# Patient Record
Sex: FEMALE | Race: BLACK OR AFRICAN AMERICAN | NOT HISPANIC OR LATINO | Employment: UNEMPLOYED | URBAN - NONMETROPOLITAN AREA
[De-identification: names, ages, dates, MRNs, and addresses within clinical notes are randomized per-mention and may not be internally consistent; named-entity substitution may affect disease eponyms.]

---

## 2017-07-26 ENCOUNTER — HOSPITAL ENCOUNTER (EMERGENCY)
Facility: HOSPITAL | Age: 32
Discharge: HOME | End: 2017-07-26
Attending: EMERGENCY MEDICINE
Payer: COMMERCIAL

## 2017-07-26 VITALS
DIASTOLIC BLOOD PRESSURE: 74 MMHG | RESPIRATION RATE: 17 BRPM | HEART RATE: 78 BPM | HEIGHT: 67 IN | OXYGEN SATURATION: 98 % | TEMPERATURE: 97.6 F | SYSTOLIC BLOOD PRESSURE: 113 MMHG | WEIGHT: 208 LBS | BODY MASS INDEX: 32.65 KG/M2

## 2017-07-26 DIAGNOSIS — B37.3 VAGINAL YEAST INFECTION: Primary | ICD-10-CM

## 2017-07-26 DIAGNOSIS — G44.209 ACUTE TENSION HEADACHE: ICD-10-CM

## 2017-07-26 PROCEDURE — 99283 EMERGENCY DEPT VISIT LOW MDM: CPT

## 2017-07-26 PROCEDURE — 96372 THER/PROPH/DIAG INJ SC/IM: CPT

## 2017-07-26 RX ORDER — KETOROLAC TROMETHAMINE 30 MG/ML
30 INJECTION, SOLUTION INTRAMUSCULAR; INTRAVENOUS ONCE
Status: COMPLETED | OUTPATIENT
Start: 2017-07-26 | End: 2017-07-26

## 2017-07-26 RX ORDER — METHOCARBAMOL 750 MG/1
750 TABLET, FILM COATED ORAL 3 TIMES DAILY
Qty: 15 TABLET | Refills: 0 | Status: SHIPPED | OUTPATIENT
Start: 2017-07-26 | End: 2018-01-29 | Stop reason: CLARIF

## 2017-07-26 RX ORDER — METRONIDAZOLE 250 MG/1
500 TABLET ORAL ONCE
Status: DISCONTINUED | OUTPATIENT
Start: 2017-07-26 | End: 2017-07-27 | Stop reason: HOSPADM

## 2017-07-26 RX ORDER — IBUPROFEN 600 MG/1
600 TABLET ORAL EVERY 6 HOURS PRN
Qty: 30 TABLET | Refills: 0 | Status: SHIPPED | OUTPATIENT
Start: 2017-07-26 | End: 2018-01-29 | Stop reason: SDUPTHER

## 2017-07-26 RX ORDER — FLUCONAZOLE 100 MG/1
200 TABLET ORAL ONCE
Status: COMPLETED | OUTPATIENT
Start: 2017-07-26 | End: 2017-07-26

## 2017-07-26 RX ADMIN — FLUCONAZOLE 200 MG: 100 TABLET ORAL at 21:52

## 2017-07-26 RX ADMIN — KETOROLAC TROMETHAMINE 30 MG: 30 INJECTION, SOLUTION INTRAMUSCULAR; INTRAVENOUS at 21:52

## 2017-08-25 ENCOUNTER — ALLSCRIPTS OFFICE VISIT (OUTPATIENT)
Dept: OTHER | Facility: OTHER | Age: 32
End: 2017-08-25

## 2017-08-25 DIAGNOSIS — Z13.6 ENCOUNTER FOR SCREENING FOR CARDIOVASCULAR DISORDERS: ICD-10-CM

## 2017-08-25 DIAGNOSIS — G43.909 MIGRAINE WITHOUT STATUS MIGRAINOSUS, NOT INTRACTABLE: ICD-10-CM

## 2017-08-25 DIAGNOSIS — B37.3 CANDIDIASIS OF VULVA AND VAGINA: ICD-10-CM

## 2017-08-25 DIAGNOSIS — Z86.32 HISTORY OF GESTATIONAL DIABETES: ICD-10-CM

## 2017-11-21 ENCOUNTER — ALLSCRIPTS OFFICE VISIT (OUTPATIENT)
Dept: OTHER | Facility: OTHER | Age: 32
End: 2017-11-21

## 2017-11-23 NOTE — PROGRESS NOTES
Assessment    1  Acute sinusitis (461 9) (J01 90)    Plan  Acute sinusitis    · DrRx Zithromax Z-Tylor 250 MG #6 pill pack; Take 2 pills on day 1, take 1 pill ondays 2-5  PMH: Yeast infection of the vagina    · Fluconazole 150 MG Oral Tablet    Discussion/Summary  The patient was counseled regarding instructions for management,-- risk factor reductions,-- prognosis,-- patient and family education,-- risks and benefits of treatment options,-- importance of compliance with treatment  Possible side effects of new medications were reviewed with the patient/guardian today  The treatment plan was reviewed with the patient/guardian  The patient/guardian understands and agrees with the treatment plan      Chief Complaint    1  Cold Symptoms  Michaela Hale is here today with cold symptoms x 4 days  She has other sick contacts at home with similar symptoms  History of Present Illness  HPI: See chief complaint  Review of Systems   Constitutional: chills, but-- no fever  ENT: earache-- and-- sore throat  Cardiovascular: no chest pain-- and-- no palpitations  Respiratory: PND, but-- no shortness of breath-- and-- no cough  Gastrointestinal: no abdominal pain,-- no constipation-- and-- no diarrhea  ROS reviewed  Past Medical History  1  History of back pain (V13 59) (Z87 39)   2  History of gestational diabetes mellitus (GDM) (V12 21) (Z86 32)   3  History of Migraines (346 90) (G43 909)   4  History of Screening for cervical cancer (V76 2) (Z12 4)   5  History of Yeast infection of the vagina (112 1) (B37 3)  Active Problems And Past Medical History Reviewed: The active problems and past medical history were reviewed and updated today  Family History  Mother    1  Family history of diabetes mellitus (V18 0) (Z83 3)   2  Family history of hypertension (V17 49) (Z82 49)  Family History Reviewed: The family history was reviewed and updated today         Social History   · Always uses seat belt   · Caffeine use (V49 89) (F15 90)   · Current smoker (305 1) (F17 200)   · No living will   · Single   · Unemployed (V62 0) (Z56 0)  The social history was reviewed and updated today  The social history was reviewed and is unchanged  Surgical History    1  History of Arthroscopy Knee Right   2  History of Hip Surgery  Surgical History Reviewed: The surgical history was reviewed and updated today  Current Meds   1  Clotrimazole 1 % External Cream; APPLY 2-3 TIMES DAILY TO AFFECTED AREA(S); Therapy: 25Ieu4147 to (Evaluate:68Dsf1337)  Requested for: 25Ero9882; Last Rx:40Avl4456 Ordered   2  Fluconazole 150 MG Oral Tablet; One tab by mouth now, then one tab by mouth in 2 days; Therapy: 73Hds6855 to (Last Johnna Nicky)  Requested for: 93Rwg1966 Ordered   3  Ibuprofen 600 MG Oral Tablet; Therapy: (Kat Lim) to Recorded   4  Methocarbamol 750 MG Oral Tablet; TAKE 1 TABLET 3 times daily PRN neck pain; Therapy: 79Fbj2497 to (Evaluate:73Ixd3294)  Requested for: 80Xzx1923; Last Rx:05Clp4918 Ordered    The medication list was reviewed and updated today  Allergies  1  No Known Drug Allergies    Vitals   Recorded: 21Nov2017 03:53PM   Temperature 43 7 F   Systolic 050, LUE, Sitting   Diastolic 82, LUE, Sitting   Height 5 ft 7 in   Weight 209 lb 2 0 oz   BMI Calculated 32 75   BSA Calculated 2 06       Physical Exam   Constitutional  General appearance: No acute distress, well appearing and well nourished  Ears, Nose, Mouth, and Throat  External inspection of ears and nose: Normal    Otoscopic examination: Tympanic membranes translucent with normal light reflex  Canals patent without erythema  Oropharynx: Abnormal   The posterior pharynx was erythematous, but-- did not have an exudate  Pulmonary  Respiratory effort: No increased work of breathing or signs of respiratory distress  Auscultation of lungs: Clear to auscultation     Cardiovascular  Auscultation of heart: Normal rate and rhythm, normal S1 and S2, without murmurs  Lymphatic  Palpation of lymph nodes in neck: Abnormal   bilateral anterior cervical node enlargement  Skin  Skin and subcutaneous tissue: Normal without rashes or lesions     Psychiatric  Orientation to person, place, and time: Normal    Mood and affect: Normal          Future Appointments    Date/Time Provider Specialty Site   11/27/2017 08:00 AM Alma Laura DO Family Medicine [address] [facility]       Signatures   Electronically signed by : Miguel Angel Tucker [facility]; Nov 21 2017  4:21PM EST                       (Author)    Electronically signed by : Megan Daniel DO; Nov 22 2017  9:33AM EST

## 2017-12-10 ENCOUNTER — HOSPITAL ENCOUNTER (EMERGENCY)
Facility: HOSPITAL | Age: 32
Discharge: HOME | End: 2017-12-10
Attending: EMERGENCY MEDICINE | Admitting: EMERGENCY MEDICINE
Payer: COMMERCIAL

## 2017-12-10 VITALS
WEIGHT: 212 LBS | TEMPERATURE: 98.1 F | DIASTOLIC BLOOD PRESSURE: 61 MMHG | SYSTOLIC BLOOD PRESSURE: 122 MMHG | RESPIRATION RATE: 18 BRPM | HEART RATE: 84 BPM | OXYGEN SATURATION: 99 % | BODY MASS INDEX: 34.07 KG/M2 | HEIGHT: 66 IN

## 2017-12-10 DIAGNOSIS — S05.02XA CORNEAL ABRASION, LEFT, INITIAL ENCOUNTER: Primary | ICD-10-CM

## 2017-12-10 PROCEDURE — 99283 EMERGENCY DEPT VISIT LOW MDM: CPT

## 2017-12-10 RX ORDER — DIPHENOXYLATE HYDROCHLORIDE AND ATROPINE SULFATE 2.5; .025 MG/1; MG/1
1 TABLET ORAL DAILY
COMMUNITY

## 2017-12-10 RX ORDER — TETRACAINE HYDROCHLORIDE 5 MG/ML
2 SOLUTION OPHTHALMIC ONCE
Status: COMPLETED | OUTPATIENT
Start: 2017-12-10 | End: 2017-12-10

## 2017-12-10 RX ORDER — CIPROFLOXACIN HYDROCHLORIDE 3.5 MG/ML
2 SOLUTION/ DROPS TOPICAL
Status: DISCONTINUED | OUTPATIENT
Start: 2017-12-10 | End: 2017-12-10 | Stop reason: HOSPADM

## 2017-12-10 RX ADMIN — TETRACAINE HYDROCHLORIDE 2 DROP: 5 SOLUTION OPHTHALMIC at 08:38

## 2017-12-10 RX ADMIN — FLUORESCEIN SODIUM 1 STRIP: 1 STRIP OPHTHALMIC at 08:38

## 2017-12-10 RX ADMIN — CIPROFLOXACIN HYDROCHLORIDE 2 DROP: 3 SOLUTION/ DROPS OPHTHALMIC at 08:37

## 2017-12-10 NOTE — ED PROVIDER NOTES
History  Chief Complaint   Patient presents with    Eye Injury     stated her 2 yr old accidentally poked her in her left eye last night  has pain and tearing since     70-year-old female presents complaining of pain to the left eye after have been scratched by her 3year-old  This occurred last evening  Patient has been having discomfort since last evening        Eye Pain   Location:  Left medial eye   Severity:  Mild  Onset quality:  Sudden  Duration:  14 hours  Timing:  Constant  Progression:  Waxing and waning  Chronicity:  New  Context:  Scratched by 3year olds fingernail  Associated symptoms: diarrhea    Associated symptoms: no abdominal pain, no chest pain, no congestion and no cough        Prior to Admission Medications   Prescriptions Last Dose Informant Patient Reported? Taking?   ibuprofen (MOTRIN) 600 mg tablet   No Yes   Sig: Take 1 tablet by mouth every 6 (six) hours as needed for mild pain for up to 3 days   methocarbamol (ROBAXIN) 750 mg tablet   No Yes   Sig: Take 1 tablet by mouth 3 (three) times a day for 5 days   multivitamin (THERAGRAN) TABS 12/10/2017 at Unknown time  Yes Yes   Sig: Take 1 tablet by mouth daily      Facility-Administered Medications: None       History reviewed  No pertinent past medical history  Past Surgical History:   Procedure Laterality Date    HIP SURGERY Bilateral     KNEE SURGERY Right        History reviewed  No pertinent family history  I have reviewed and agree with the history as documented  Social History   Substance Use Topics    Smoking status: Current Every Day Smoker     Packs/day: 0 20     Types: Cigarettes    Smokeless tobacco: Never Used    Alcohol use No        Review of Systems   Constitutional: Negative  HENT: Negative for congestion  Eyes: Positive for pain  Respiratory: Negative for cough  Cardiovascular: Negative for chest pain  Gastrointestinal: Positive for diarrhea  Negative for abdominal pain     Endocrine: Negative for cold intolerance  Genitourinary: Negative for difficulty urinating, dysuria and enuresis  Musculoskeletal: Negative for arthralgias, back pain, gait problem and joint swelling  Allergic/Immunologic: Negative for environmental allergies and food allergies  Neurological: Negative for seizures, speech difficulty and numbness  Hematological: Negative for adenopathy  Psychiatric/Behavioral: Negative for agitation, behavioral problems and confusion  All other systems reviewed and are negative  Physical Exam  ED Triage Vitals [12/10/17 0826]   Temperature Pulse Respirations Blood Pressure SpO2   98 1 °F (36 7 °C) 84 18 122/61 99 %      Temp Source Heart Rate Source Patient Position - Orthostatic VS BP Location FiO2 (%)   Tympanic -- Lying Left arm --      Pain Score       7           Orthostatic Vital Signs  Vitals:    12/10/17 0826   BP: 122/61   Pulse: 84   Patient Position - Orthostatic VS: Lying       Physical Exam   Constitutional: She appears well-developed and well-nourished  HENT:   Head: Normocephalic  Right Ear: External ear normal    Left Ear: External ear normal    Eyes: Pupils are equal, round, and reactive to light  Minimal injection to the left    Neck: Normal range of motion  No tracheal deviation present  No thyromegaly present  Cardiovascular: Normal rate and regular rhythm  Pulmonary/Chest: Effort normal  No respiratory distress  She has no wheezes  Abdominal: Soft  She exhibits no distension  There is no tenderness  There is no guarding  Musculoskeletal: Normal range of motion  She exhibits no edema or deformity  Neurological: She is alert  She displays normal reflexes  No cranial nerve deficit  Coordination normal    Skin: Skin is warm  Capillary refill takes less than 2 seconds  Psychiatric: She has a normal mood and affect  Her behavior is normal    Vitals reviewed        ED Medications  Medications   ciprofloxacin (CILOXAN) 0 3 % ophthalmic solution 2 drop (2 drops Left Eye Given 12/10/17 0837)   tetracaine 0 5 % ophthalmic solution 2 drop (2 drops Left Eye Given 12/10/17 5857)   fluorescein sodium sterile 1 mg ophthalmic strip 1 strip (1 strip Left Eye Given 12/10/17 3192)       Diagnostic Studies  Results Reviewed     None                 No orders to display              Procedures  Procedures       Phone Contacts  ED Phone Contact    ED Course  ED Course                                MDM  Number of Diagnoses or Management Options  Corneal abrasion, left, initial encounter:   Diagnosis management comments: Procedure note  Fluorescein exam to the left eye  Patient was draped and prepped in the sterile fashion  Fluorescein was swiped to the bulbar region of the left eye  This was undertaken after the patient was numbed with 2 drops of proparacaine  Using a Woods lamp it was noted that the patient had uptake to the 10 o'clock position of the left eye  There was a negative Dale sign and no discharge from the eye    CritCare Time    Disposition  Final diagnoses:   Corneal abrasion, left, initial encounter     Time reflects when diagnosis was documented in both MDM as applicable and the Disposition within this note     Time User Action Codes Description Comment    12/10/2017  8:30 AM Samira Moreno Add [S05  02XA] Corneal abrasion, left, initial encounter       ED Disposition     ED Disposition Condition Comment    Discharge  Gregg Manzano discharge to home/self care  Condition at discharge: Good        Follow-up Information     Follow up With Specialties Details Why Lc Ritchie MD Ophthalmology In 1 day  [address] [place] [address],  [address] [place] [address] [phone]          Patient's Medications   Discharge Prescriptions    No medications on file     No discharge procedures on file      ED Provider  Electronically Signed by           Dorothy Parra DO  12/10/17 5332

## 2017-12-10 NOTE — DISCHARGE INSTRUCTIONS
Corneal Abrasion   WHAT YOU NEED TO KNOW:   A corneal abrasion is a scratch on the cornea of your eye  The cornea is the clear layer that covers the front of your eye  A small scratch may heal in 1 to 2 days  Deeper or larger scratches may take longer to heal         DISCHARGE INSTRUCTIONS:   Contact your healthcare provider if:   · Your eye pain or vision gets worse  · You have yellow or green drainage from your eye  · You have questions or concerns about your condition or care  Medicines:   · Medicines  may be given in the form of eyedrops or ointment to help prevent an eye infection  You may also be given eye drops to decrease pain  Ask how to take this medicine safely  · Take your medicine as directed  Contact your healthcare provider if you think your medicine is not helping or if you have side effects  Tell him or her if you are allergic to any medicine  Keep a list of the medicines, vitamins, and herbs you take  Include the amounts, and when and why you take them  Bring the list or the pill bottles to follow-up visits  Carry your medicine list with you in case of an emergency  Follow up with your healthcare provider as directed:  Write down your questions so you remember to ask them during your visits  Self-care:   · Do not touch or rub your eye  · Ask your healthcare provider when you can start your normal activities  · Ask your healthcare provider when you can wear your contact lenses  · Wear sunglasses in bright light until your eyes feel better  Help prevent corneal abrasions:   · Remove your contact lenses if your eyes feel dry or irritated  · Wash your hands if you need to touch your eyes or your face  · Trim your child's fingernails so he cannot scratch his eye  · Wear protective eyewear when you work with chemicals, wood, dust, or metal      · Wear protective eyewear when you play sports  · Do not wear your contacts for longer than you should       · Do not wear colored lenses or lenses with shapes on them  These lenses may cause eye damage and vision loss  · Do not wear glitter makeup  Glitter can easily get into your eyes and under contact lenses  · Do not sleep with your contacts in your eyes  © 2017 [address] [employer] Information is for End User's use only and may not be sold, redistributed or otherwise used for commercial purposes  All illustrations and images included in CareNotes® are the copyrighted property of [employer] , [employer]  or Pierre Ferraro  The above information is an  only  It is not intended as medical advice for individual conditions or treatments  Talk to your doctor, nurse or pharmacist before following any medical regimen to see if it is safe and effective for you

## 2018-01-13 VITALS
BODY MASS INDEX: 32.49 KG/M2 | DIASTOLIC BLOOD PRESSURE: 78 MMHG | WEIGHT: 207 LBS | SYSTOLIC BLOOD PRESSURE: 122 MMHG | HEIGHT: 67 IN

## 2018-01-13 VITALS
DIASTOLIC BLOOD PRESSURE: 82 MMHG | HEIGHT: 67 IN | BODY MASS INDEX: 32.82 KG/M2 | TEMPERATURE: 97.8 F | WEIGHT: 209.13 LBS | SYSTOLIC BLOOD PRESSURE: 118 MMHG

## 2018-01-26 RX ORDER — METHOCARBAMOL 750 MG/1
TABLET, FILM COATED ORAL EVERY 8 HOURS
COMMUNITY
Start: 2017-08-25 | End: 2018-01-29 | Stop reason: SDUPTHER

## 2018-01-26 RX ORDER — IBUPROFEN 600 MG/1
600 TABLET ORAL AS NEEDED
COMMUNITY
End: 2018-01-29 | Stop reason: SDUPTHER

## 2018-01-26 RX ORDER — CLOTRIMAZOLE 1 %
CREAM (GRAM) TOPICAL 3 TIMES DAILY
COMMUNITY
Start: 2017-08-25 | End: 2018-01-29 | Stop reason: CLARIF

## 2018-01-26 RX ORDER — AZITHROMYCIN 250 MG/1
TABLET, FILM COATED ORAL
COMMUNITY
Start: 2017-11-21 | End: 2018-01-29 | Stop reason: CLARIF

## 2018-01-29 ENCOUNTER — APPOINTMENT (OUTPATIENT)
Dept: LAB | Facility: MEDICAL CENTER | Age: 33
End: 2018-01-29
Payer: COMMERCIAL

## 2018-01-29 ENCOUNTER — TRANSCRIBE ORDERS (OUTPATIENT)
Dept: RADIOLOGY | Facility: MEDICAL CENTER | Age: 33
End: 2018-01-29

## 2018-01-29 ENCOUNTER — OFFICE VISIT (OUTPATIENT)
Dept: FAMILY MEDICINE CLINIC | Facility: CLINIC | Age: 33
End: 2018-01-29
Payer: COMMERCIAL

## 2018-01-29 VITALS
DIASTOLIC BLOOD PRESSURE: 62 MMHG | HEIGHT: 67 IN | BODY MASS INDEX: 32.33 KG/M2 | SYSTOLIC BLOOD PRESSURE: 112 MMHG | WEIGHT: 206 LBS

## 2018-01-29 DIAGNOSIS — B37.3 CANDIDIASIS OF VULVA AND VAGINA: ICD-10-CM

## 2018-01-29 DIAGNOSIS — Z13.6 ENCOUNTER FOR SCREENING FOR CARDIOVASCULAR DISORDERS: ICD-10-CM

## 2018-01-29 DIAGNOSIS — Z00.00 PHYSICAL EXAM: Primary | ICD-10-CM

## 2018-01-29 DIAGNOSIS — Z00.00 PHYSICAL EXAM: ICD-10-CM

## 2018-01-29 DIAGNOSIS — G43.719 INTRACTABLE CHRONIC MIGRAINE WITHOUT AURA AND WITHOUT STATUS MIGRAINOSUS: ICD-10-CM

## 2018-01-29 DIAGNOSIS — G43.909 MIGRAINE WITHOUT STATUS MIGRAINOSUS, NOT INTRACTABLE: ICD-10-CM

## 2018-01-29 DIAGNOSIS — L68.0 HIRSUTISM: ICD-10-CM

## 2018-01-29 DIAGNOSIS — Z86.32 HISTORY OF GESTATIONAL DIABETES: ICD-10-CM

## 2018-01-29 PROBLEM — M54.50 CHRONIC BILATERAL LOW BACK PAIN WITHOUT SCIATICA: Status: ACTIVE | Noted: 2018-01-29

## 2018-01-29 PROBLEM — G89.29 CHRONIC BILATERAL LOW BACK PAIN WITHOUT SCIATICA: Status: ACTIVE | Noted: 2018-01-29

## 2018-01-29 LAB
ALBUMIN SERPL BCP-MCNC: 3.8 G/DL (ref 3.5–5)
ALP SERPL-CCNC: 88 U/L (ref 46–116)
ALT SERPL W P-5'-P-CCNC: 43 U/L (ref 12–78)
ANION GAP SERPL CALCULATED.3IONS-SCNC: 5 MMOL/L (ref 4–13)
AST SERPL W P-5'-P-CCNC: 58 U/L (ref 5–45)
BASOPHILS # BLD AUTO: 0.01 THOUSANDS/ΜL (ref 0–0.1)
BASOPHILS NFR BLD AUTO: 0 % (ref 0–1)
BILIRUB SERPL-MCNC: 0.36 MG/DL (ref 0.2–1)
BUN SERPL-MCNC: 8 MG/DL (ref 5–25)
CALCIUM SERPL-MCNC: 8.9 MG/DL (ref 8.3–10.1)
CHLORIDE SERPL-SCNC: 106 MMOL/L (ref 100–108)
CHOLEST SERPL-MCNC: 175 MG/DL (ref 50–200)
CO2 SERPL-SCNC: 29 MMOL/L (ref 21–32)
CREAT SERPL-MCNC: 0.75 MG/DL (ref 0.6–1.3)
EOSINOPHIL # BLD AUTO: 0.01 THOUSAND/ΜL (ref 0–0.61)
EOSINOPHIL NFR BLD AUTO: 0 % (ref 0–6)
ERYTHROCYTE [DISTWIDTH] IN BLOOD BY AUTOMATED COUNT: 13.5 % (ref 11.6–15.1)
EST. AVERAGE GLUCOSE BLD GHB EST-MCNC: 148 MG/DL
GFR SERPL CREATININE-BSD FRML MDRD: 122 ML/MIN/1.73SQ M
GLUCOSE P FAST SERPL-MCNC: 87 MG/DL (ref 65–99)
HBA1C MFR BLD: 6.8 % (ref 4.2–6.3)
HCT VFR BLD AUTO: 44.3 % (ref 34.8–46.1)
HDLC SERPL-MCNC: 44 MG/DL (ref 40–60)
HGB BLD-MCNC: 14.4 G/DL (ref 11.5–15.4)
LDLC SERPL CALC-MCNC: 112 MG/DL (ref 0–100)
LYMPHOCYTES # BLD AUTO: 2.33 THOUSANDS/ΜL (ref 0.6–4.47)
LYMPHOCYTES NFR BLD AUTO: 31 % (ref 14–44)
MCH RBC QN AUTO: 26.3 PG (ref 26.8–34.3)
MCHC RBC AUTO-ENTMCNC: 32.5 G/DL (ref 31.4–37.4)
MCV RBC AUTO: 81 FL (ref 82–98)
MONOCYTES # BLD AUTO: 0.37 THOUSAND/ΜL (ref 0.17–1.22)
MONOCYTES NFR BLD AUTO: 5 % (ref 4–12)
NEUTROPHILS # BLD AUTO: 4.72 THOUSANDS/ΜL (ref 1.85–7.62)
NEUTS SEG NFR BLD AUTO: 64 % (ref 43–75)
NRBC BLD AUTO-RTO: 0 /100 WBCS
PLATELET # BLD AUTO: 254 THOUSANDS/UL (ref 149–390)
PMV BLD AUTO: 10.8 FL (ref 8.9–12.7)
POTASSIUM SERPL-SCNC: 4.1 MMOL/L (ref 3.5–5.3)
PROT SERPL-MCNC: 8.2 G/DL (ref 6.4–8.2)
RBC # BLD AUTO: 5.48 MILLION/UL (ref 3.81–5.12)
SODIUM SERPL-SCNC: 140 MMOL/L (ref 136–145)
TRIGL SERPL-MCNC: 95 MG/DL
TSH SERPL DL<=0.05 MIU/L-ACNC: 1.01 UIU/ML (ref 0.36–3.74)
WBC # BLD AUTO: 7.45 THOUSAND/UL (ref 4.31–10.16)

## 2018-01-29 PROCEDURE — 83036 HEMOGLOBIN GLYCOSYLATED A1C: CPT

## 2018-01-29 PROCEDURE — 36415 COLL VENOUS BLD VENIPUNCTURE: CPT

## 2018-01-29 PROCEDURE — 80053 COMPREHEN METABOLIC PANEL: CPT

## 2018-01-29 PROCEDURE — 85025 COMPLETE CBC W/AUTO DIFF WBC: CPT

## 2018-01-29 PROCEDURE — 99395 PREV VISIT EST AGE 18-39: CPT | Performed by: PHYSICIAN ASSISTANT

## 2018-01-29 PROCEDURE — 80061 LIPID PANEL: CPT

## 2018-01-29 PROCEDURE — 84443 ASSAY THYROID STIM HORMONE: CPT

## 2018-01-29 RX ORDER — METHOCARBAMOL 750 MG/1
750 TABLET, FILM COATED ORAL EVERY 8 HOURS
Qty: 30 TABLET | Refills: 0 | Status: SHIPPED | OUTPATIENT
Start: 2018-01-29 | End: 2018-02-13 | Stop reason: ALTCHOICE

## 2018-01-29 RX ORDER — IBUPROFEN 600 MG/1
600 TABLET ORAL EVERY 6 HOURS PRN
Qty: 30 TABLET | Refills: 2 | Status: SHIPPED | OUTPATIENT
Start: 2018-01-29

## 2018-01-29 NOTE — PROGRESS NOTES
Assessment/Plan:    No problem-specific Assessment & Plan notes found for this encounter  Diagnoses and all orders for this visit:    Physical exam  -     Lipid Panel with Direct LDL reflex; Future  -     CBC and Platelet; Future  -     Comprehensive metabolic panel; Future  -     TSH baseline; Future    Hirsutism    Other orders  -     Discontinue: clotrimazole (LOTRIMIN) 1 % cream; Apply topically 3 (three) times a day  -     Discontinue: azithromycin (ZITHROMAX) 250 mg tablet; Zithromax Z-Tylor 250 MG; Take 2 pills on day 1, take 1 pill on days 2-5; 6; 0; 21-Nov-2017; 21-Nov-2017; Cayden Fess; Active  -     ibuprofen (MOTRIN) 600 mg tablet; Take 600 mg by mouth as needed for moderate pain    -     methocarbamol (ROBAXIN) 750 mg tablet; Take by mouth every 8 (eight) hours        Ailin Tovar to call her OBGYN to discuss early hirsutism  Subjective:      Patient ID: Nitin Dial is a 28 y o  female  Starlene Oiler is here today for her annual well visit  She is requesting baseline labs  Has not seen her OBGYN in 2 years  She is complaining of facial hair under her chin  The following portions of the patient's history were reviewed and updated as appropriate: She  has a past medical history of Gestational diabetes mellitus and Migraines  She  does not have any pertinent problems on file  She  has a past surgical history that includes Hip surgery (Bilateral) and Knee surgery (Right)  Her family history includes Diabetes in her mother; Hypertension in her mother  She  reports that she has been smoking Cigarettes  She has been smoking about 0 20 packs per day  She has never used smokeless tobacco  She reports that she does not drink alcohol or use drugs    Current Outpatient Prescriptions   Medication Sig Dispense Refill    ibuprofen (MOTRIN) 600 mg tablet Take 600 mg by mouth as needed for moderate pain        methocarbamol (ROBAXIN) 750 mg tablet Take by mouth every 8 (eight) hours      multivitamin (THERAGRAN) TABS Take 1 tablet by mouth daily       No current facility-administered medications for this visit  Current Outpatient Prescriptions on File Prior to Visit   Medication Sig    multivitamin (THERAGRAN) TABS Take 1 tablet by mouth daily    [DISCONTINUED] ibuprofen (MOTRIN) 600 mg tablet Take 1 tablet by mouth every 6 (six) hours as needed for mild pain for up to 3 days    [DISCONTINUED] methocarbamol (ROBAXIN) 750 mg tablet Take 1 tablet by mouth 3 (three) times a day for 5 days     No current facility-administered medications on file prior to visit  She has No Known Allergies       Review of Systems   Constitutional: Negative for chills and fever  HENT: Negative for congestion, ear discharge, postnasal drip, rhinorrhea and sinus pressure  Eyes: Negative for visual disturbance  Respiratory: Negative for cough and shortness of breath  Cardiovascular: Negative for chest pain and leg swelling  Gastrointestinal: Negative for abdominal pain, anal bleeding, constipation, diarrhea, nausea and vomiting  Genitourinary: Negative for flank pain, hematuria and menstrual problem  Musculoskeletal: Negative for arthralgias, back pain and myalgias  Neurological: Negative for dizziness, syncope and numbness  Objective:     Physical Exam   Constitutional: She is oriented to person, place, and time  She appears well-developed and well-nourished  HENT:   Head: Normocephalic and atraumatic  Right Ear: External ear normal    Left Ear: External ear normal    Nose: Nose normal    Mouth/Throat: Oropharynx is clear and moist    Eyes: Conjunctivae and EOM are normal  Pupils are equal, round, and reactive to light  Right eye exhibits no discharge  Left eye exhibits no discharge  No scleral icterus  Neck: Normal range of motion  Neck supple  No thyromegaly present  Cardiovascular: Normal rate, regular rhythm and normal heart sounds      Pulmonary/Chest: Effort normal and breath sounds normal  No respiratory distress  She has no wheezes  She has no rales  Abdominal: Soft  There is no tenderness  There is no rebound  Musculoskeletal: Normal range of motion  Lymphadenopathy:     She has no cervical adenopathy  Neurological: She is alert and oriented to person, place, and time  She has normal reflexes  Skin: Skin is warm and dry

## 2018-01-29 NOTE — PATIENT INSTRUCTIONS

## 2018-01-30 DIAGNOSIS — E11.9 TYPE 2 DIABETES MELLITUS WITHOUT COMPLICATION, WITHOUT LONG-TERM CURRENT USE OF INSULIN (HCC): Primary | ICD-10-CM

## 2018-02-01 ENCOUNTER — TELEPHONE (OUTPATIENT)
Dept: FAMILY MEDICINE CLINIC | Facility: CLINIC | Age: 33
End: 2018-02-01

## 2018-02-01 NOTE — TELEPHONE ENCOUNTER
Pt aware and would like to discuss this further  Pt is going to schedule an appt w/ either you or Dr Dre Garza

## 2018-02-01 NOTE — TELEPHONE ENCOUNTER
The lab result/advice actually came from Dr Jose Smith, not me  He put on the lab result to start metformin 500mg each evening  I am in agreement with him

## 2018-02-13 ENCOUNTER — OFFICE VISIT (OUTPATIENT)
Dept: FAMILY MEDICINE CLINIC | Facility: CLINIC | Age: 33
End: 2018-02-13
Payer: COMMERCIAL

## 2018-02-13 ENCOUNTER — TELEPHONE (OUTPATIENT)
Dept: FAMILY MEDICINE CLINIC | Facility: CLINIC | Age: 33
End: 2018-02-13

## 2018-02-13 VITALS
WEIGHT: 203.6 LBS | DIASTOLIC BLOOD PRESSURE: 80 MMHG | SYSTOLIC BLOOD PRESSURE: 112 MMHG | HEIGHT: 67 IN | BODY MASS INDEX: 31.96 KG/M2

## 2018-02-13 DIAGNOSIS — S46.811A TRAPEZIUS STRAIN, RIGHT, INITIAL ENCOUNTER: ICD-10-CM

## 2018-02-13 DIAGNOSIS — R73.09 ELEVATED GLUCOSE: Primary | ICD-10-CM

## 2018-02-13 DIAGNOSIS — Z12.4 PAP SMEAR FOR CERVICAL CANCER SCREENING: ICD-10-CM

## 2018-02-13 PROCEDURE — 99214 OFFICE O/P EST MOD 30 MIN: CPT | Performed by: PHYSICIAN ASSISTANT

## 2018-02-13 PROCEDURE — 3008F BODY MASS INDEX DOCD: CPT | Performed by: PHYSICIAN ASSISTANT

## 2018-02-13 RX ORDER — CYCLOBENZAPRINE HCL 5 MG
5 TABLET ORAL
Qty: 10 TABLET | Refills: 0 | Status: SHIPPED | OUTPATIENT
Start: 2018-02-13 | End: 2018-07-09 | Stop reason: ALTCHOICE

## 2018-02-13 NOTE — PROGRESS NOTES
Assessment/Plan:    No problem-specific Assessment & Plan notes found for this encounter  Diagnoses and all orders for this visit:    Elevated glucose    Pap smear for cervical cancer screening  -     Ambulatory referral to Gynecology; Future    Trapezius strain, right, initial encounter  -     cyclobenzaprine (FLEXERIL) 5 mg tablet; Take 1 tablet (5 mg total) by mouth daily at bedtime for 10 days          Mildred Peralta was already given an Rx for metformin but she did not start taking the medication  After today's discussion, she will start taking it and will return in 3 months for a recheck OV with labs  Continue low carb diet and strive for further weight loss  Will change Robaxin to a trial of Flexeril for her R trapezius strain  Continue PRN heating pad  Subjective:      Patient ID: Fernanda Montoya is a 28 y o  female  Diabetes   She presents for her initial diabetic visit  She has type 2 diabetes mellitus  There are no hypoglycemic associated symptoms  Pertinent negatives for hypoglycemia include no dizziness or headaches  There are no diabetic associated symptoms  Pertinent negatives for diabetes include no chest pain, no fatigue, no polydipsia and no polyuria  There are no hypoglycemic complications  There are no diabetic complications  Risk factors for coronary artery disease include family history and tobacco exposure  Current diabetic treatment includes diet  She is compliant with treatment most of the time  Her weight is decreasing steadily  She is following a low fat/cholesterol, low salt, diabetic and generally healthy diet  Meal planning includes avoidance of concentrated sweets and calorie counting  Shoulder Pain    The pain is present in the right shoulder  This is a new problem  The current episode started in the past 7 days  There has been no history of extremity trauma  The problem occurs constantly  The problem has been unchanged   Associated symptoms include stiffness (has tried robaxin without relief)  Pertinent negatives include no fever or numbness  She has tried heat and OTC pain meds for the symptoms  The treatment provided no relief  The following portions of the patient's history were reviewed and updated as appropriate: She  has a past medical history of Gestational diabetes mellitus and Migraines  She  does not have any pertinent problems on file  She  has a past surgical history that includes Hip surgery (Bilateral) and Knee surgery (Right)  Her family history includes Diabetes in her mother; Hypertension in her mother  She  reports that she has been smoking Cigarettes  She has been smoking about 0 20 packs per day  She has never used smokeless tobacco  She reports that she does not drink alcohol or use drugs  Current Outpatient Prescriptions   Medication Sig Dispense Refill    ibuprofen (MOTRIN) 600 mg tablet Take 1 tablet (600 mg total) by mouth every 6 (six) hours as needed for moderate pain 30 tablet 2    multivitamin (THERAGRAN) TABS Take 1 tablet by mouth daily      cyclobenzaprine (FLEXERIL) 5 mg tablet Take 1 tablet (5 mg total) by mouth daily at bedtime for 10 days 10 tablet 0    metFORMIN (GLUCOPHAGE) 500 mg tablet 1 tab daily in the p m  30 tablet 3     No current facility-administered medications for this visit  Current Outpatient Prescriptions on File Prior to Visit   Medication Sig    ibuprofen (MOTRIN) 600 mg tablet Take 1 tablet (600 mg total) by mouth every 6 (six) hours as needed for moderate pain    multivitamin (THERAGRAN) TABS Take 1 tablet by mouth daily    [DISCONTINUED] methocarbamol (ROBAXIN) 750 mg tablet Take 1 tablet (750 mg total) by mouth every 8 (eight) hours    metFORMIN (GLUCOPHAGE) 500 mg tablet 1 tab daily in the p m  No current facility-administered medications on file prior to visit  She has No Known Allergies       Review of Systems   Constitutional: Negative for chills, fatigue and fever     HENT: Negative for sinus pain and sinus pressure  Respiratory: Negative for cough and shortness of breath  Cardiovascular: Negative for chest pain  Gastrointestinal: Negative for abdominal pain, blood in stool, constipation, diarrhea, nausea and vomiting  Endocrine: Negative for polydipsia and polyuria  Genitourinary: Negative for dysuria  Musculoskeletal: Positive for myalgias (R shoulder stiff/ache) and stiffness (has tried robaxin without relief)  Negative for arthralgias  Skin: Negative for rash  Neurological: Negative for dizziness, numbness and headaches  Objective:    Vitals:    02/13/18 0919   BP: 112/80        Physical Exam   Constitutional: She is oriented to person, place, and time  She appears well-developed and well-nourished  No distress  HENT:   Head: Normocephalic  Mouth/Throat: Oropharynx is clear and moist  No oropharyngeal exudate  Eyes: Conjunctivae are normal  Pupils are equal, round, and reactive to light  Neck: Neck supple  Cardiovascular: Normal rate, regular rhythm and normal heart sounds  Pulmonary/Chest: Effort normal and breath sounds normal  No respiratory distress  Musculoskeletal: Normal range of motion  She exhibits tenderness (R trapezius)  Lymphadenopathy:     She has no cervical adenopathy  Neurological: She is alert and oriented to person, place, and time  Skin: Skin is warm and dry  She is not diaphoretic  Psychiatric: She has a normal mood and affect   Her behavior is normal  Judgment and thought content normal

## 2018-07-09 ENCOUNTER — HOSPITAL ENCOUNTER (EMERGENCY)
Facility: HOSPITAL | Age: 33
Discharge: HOME | End: 2018-07-09
Admitting: EMERGENCY MEDICINE

## 2018-07-09 VITALS
OXYGEN SATURATION: 99 % | TEMPERATURE: 97.8 F | BODY MASS INDEX: 30.02 KG/M2 | SYSTOLIC BLOOD PRESSURE: 116 MMHG | RESPIRATION RATE: 19 BRPM | WEIGHT: 191.7 LBS | HEART RATE: 79 BPM | DIASTOLIC BLOOD PRESSURE: 71 MMHG

## 2018-07-09 DIAGNOSIS — H10.9 CONJUNCTIVITIS: ICD-10-CM

## 2018-07-09 DIAGNOSIS — B37.3 YEAST VAGINITIS: ICD-10-CM

## 2018-07-09 DIAGNOSIS — J30.9 ALLERGIC RHINITIS: Primary | ICD-10-CM

## 2018-07-09 PROCEDURE — 99282 EMERGENCY DEPT VISIT SF MDM: CPT

## 2018-07-09 RX ORDER — TOBRAMYCIN 3 MG/ML
1 SOLUTION/ DROPS OPHTHALMIC
Qty: 1 BOTTLE | Refills: 0 | Status: SHIPPED | OUTPATIENT
Start: 2018-07-09

## 2018-07-09 RX ORDER — FLUCONAZOLE 150 MG/1
150 TABLET ORAL ONCE
Qty: 2 TABLET | Refills: 0 | Status: SHIPPED | OUTPATIENT
Start: 2018-07-09 | End: 2018-07-09

## 2018-07-09 NOTE — ED PROVIDER NOTES
History  Chief Complaint   Patient presents with    Eye Redness     Patient has right eye redness and irritation from fake eye lashes, patient also c/o sore throat that is painful to swallow  Denies complications with breathing  Patient presents to the emergency department today offering a chief complaint of right eye redness  She also complains of bilateral clear eye discharge with nasal congestion sore throat and nonproductive cough  No shortness of breath  No fever  Patient also states that she wore a wet bathing suit and believes that she has a yeast infection because she has white vaginal itchy discharge which is foul-smelling  She denies sexual activity  Prior to Admission Medications   Prescriptions Last Dose Informant Patient Reported? Taking?   ibuprofen (MOTRIN) 600 mg tablet   No Yes   Sig: Take 1 tablet (600 mg total) by mouth every 6 (six) hours as needed for moderate pain   metFORMIN (GLUCOPHAGE) 500 mg tablet   No Yes   Si tab daily in the p m    multivitamin (THERAGRAN) TABS   Yes Yes   Sig: Take 1 tablet by mouth daily      Facility-Administered Medications: None       Past Medical History:   Diagnosis Date    Gestational diabetes mellitus     Migraines     last assessed 17       Past Surgical History:   Procedure Laterality Date    HIP SURGERY Bilateral     KNEE SURGERY Right        Family History   Problem Relation Age of Onset    Diabetes Mother     Hypertension Mother      I have reviewed and agree with the history as documented  Social History   Substance Use Topics    Smoking status: Current Every Day Smoker     Packs/day: 0 20     Types: Cigarettes    Smokeless tobacco: Never Used    Alcohol use No        Review of Systems   Constitutional: Negative  HENT: Positive for congestion and sore throat   Negative for dental problem, drooling, ear discharge, ear pain, facial swelling, hearing loss, mouth sores, nosebleeds, postnasal drip, rhinorrhea, sinus pain, sinus pressure, sneezing, tinnitus, trouble swallowing and voice change  Eyes: Positive for discharge, redness and itching  Negative for photophobia, pain and visual disturbance  Respiratory: Positive for cough  Negative for apnea, choking, chest tightness, shortness of breath, wheezing and stridor  Cardiovascular: Negative  Gastrointestinal: Negative  Endocrine: Negative  Genitourinary: Negative  Musculoskeletal: Negative  Skin: Negative  Allergic/Immunologic: Negative  Neurological: Negative  Hematological: Negative  Psychiatric/Behavioral: Negative  All other systems reviewed and are negative  Physical Exam  Physical Exam   Constitutional: She is oriented to person, place, and time  She appears well-developed and well-nourished  No distress  HENT:   Head: Normocephalic  Right Ear: External ear normal    Left Ear: External ear normal    Nose: Nose normal    Mouth/Throat: Oropharynx is clear and moist    Eyes: EOM are normal  Pupils are equal, round, and reactive to light  Right eye exhibits scleral injection with clear discharge  Left eye no injection but with some clear discharge  No evidence corneal abrasions  No foreign body  There is minimal crusting of the medial canthus left eye   Neck: Normal range of motion  No JVD present  No tracheal deviation present  No thyromegaly present  Cardiovascular: Normal rate, regular rhythm, normal heart sounds and intact distal pulses  Exam reveals no gallop and no friction rub  No murmur heard  Pulmonary/Chest: Effort normal and breath sounds normal  No stridor  No respiratory distress  She has no wheezes  She has no rales  She exhibits no tenderness  Abdominal: Soft  There is no tenderness  Musculoskeletal: Normal range of motion  Lymphadenopathy:     She has no cervical adenopathy  Neurological: She is alert and oriented to person, place, and time  Skin: Skin is warm   Capillary refill takes less than 2 seconds  She is not diaphoretic  Psychiatric: She has a normal mood and affect  Vitals reviewed  Vital Signs  ED Triage Vitals [07/09/18 1329]   Temperature Pulse Respirations Blood Pressure SpO2   97 8 °F (36 6 °C) 79 19 116/71 99 %      Temp Source Heart Rate Source Patient Position - Orthostatic VS BP Location FiO2 (%)   Temporal Monitor Sitting Right arm --      Pain Score       7           Vitals:    07/09/18 1329   BP: 116/71   Pulse: 79   Patient Position - Orthostatic VS: Sitting       Visual Acuity      ED Medications  Medications - No data to display    Diagnostic Studies  Results Reviewed     None                 No orders to display              Procedures  Procedures       Phone Contacts  ED Phone Contact    ED Course  ED Course as of Jul 09 1448   [facility] Jul 09, 2018   1435 Blood Pressure: 116/71   1436 Temperature: 97 8 °F (36 6 °C)   1436 Pulse: 79   1436 Respirations: 19   1436 SpO2: 99 %                               MDM  CritCare Time    Disposition  Final diagnoses: Allergic rhinitis   Conjunctivitis   Yeast vaginitis     Time reflects when diagnosis was documented in both MDM as applicable and the Disposition within this note     Time User Action Codes Description Comment    7/9/2018  2:46 PM Shalom RIZO Add [J30 9] Allergic rhinitis     7/9/2018  2:46 PM Shalom RIZO Add [H10 9] Conjunctivitis     7/9/2018  2:47 PM Pavan Mcclellan Add [B37 3] Yeast vaginitis       ED Disposition     ED Disposition Condition Comment    Discharge  Gregg Pitcher discharge to home/self care      Condition at discharge: Good        Follow-up Information     Follow up With Specialties Details Why Lc Ritchie MD Ophthalmology Schedule an appointment as soon as possible for a visit  [address] [place] [address], [facility] [address] [phone]            Patient's Medications   Discharge Prescriptions    FLUCONAZOLE (DIFLUCAN) 150 MG TABLET    Take 1 tablet (150 mg total) by mouth once for 1 dose Take 1 tablet if symptoms do not resolve in 72 hours take a 2nd dose       Start Date: 7/9/2018  End Date: 7/9/2018       Order Dose: 150 mg       Quantity: 2 tablet    Refills: 0    TOBRAMYCIN (TOBREX) 0 3 % SOLN    Administer 1 drop to the right eye every 4 (four) hours while awake       Start Date: 7/9/2018  End Date: --       Order Dose: 1 drop       Quantity: 1 Bottle    Refills: 0     No discharge procedures on file      ED Provider  Electronically Signed by           Tyrell Fraser PA-C  07/09/18 7460

## 2018-07-09 NOTE — DISCHARGE INSTRUCTIONS
Allergic Rhinitis   WHAT YOU NEED TO KNOW:   Allergic rhinitis, or hay fever, is swelling of the inside of your nose  The swelling is a reaction to allergens in the air  An allergen can be anything that causes an allergic reaction  Allergies to weeds, grass, trees, or mold often cause seasonal allergic rhinitis  Indoor dust mites, cockroaches, pet dander, or mold can also cause allergic rhinitis  DISCHARGE INSTRUCTIONS:   Call 911 for the following:   · You have chest pain or shortness of breath  Return to the emergency department if:   · You have severe pain  · You cough up blood  Contact your healthcare provider if:   · You have a fever  · You have ear or sinus pain, or a headache  · Your symptoms get worse, even after treatment  · You have yellow, green, brown, or bloody mucus coming from your nose  · Your nose is bleeding or you have pain inside your nose  · You have trouble sleeping because of your symptoms  · You have questions or concerns about your condition or care  Medicines:   · Medicines  help decrease your symptoms and clear your stuffy nose  · Take your medicine as directed  Contact your healthcare provider if you think your medicine is not helping or if you have side effects  Tell him of her if you are allergic to any medicine  Keep a list of the medicines, vitamins, and herbs you take  Include the amounts, and when and why you take them  Bring the list or the pill bottles to follow-up visits  Carry your medicine list with you in case of an emergency  How to manage allergic rhinitis:  The best way to manage allergic rhinitis is to avoid allergens that can trigger your symptoms  Any of the following may help decrease your symptoms:  · Rinse your nose and sinuses  with a salt water solution or use a salt water nasal spray  This will help thin the mucus in your nose and rinse away pollen and dirt  It will also help reduce swelling so you can breathe normally  Ask your healthcare provider how often to rinse your nose  · Reduce exposure to dust mites  Wash sheets and towels in hot water every week  Cover your pillows and mattresses with allergen-free covers  Limit the number of stuffed animals and soft toys your child has  Wash your child's toys in hot water regularly  Vacuum weekly and use a vacuum  with an air filter  If possible, get rid of carpets and curtains  These collect dust and dust mites  · Reduce exposure to pollen  Keep windows and doors closed in your house and car  Stay inside when air pollution or the pollen count is high  Run your air conditioner on recycle, and change air filters often  Shower and wash your hair before bed every night to rinse away pollen  · Reduce exposure to pet dander  If possible, do not keep cats, dogs, birds, or other pets  If you do keep pets in your home, keep them out of bedrooms and carpeted rooms  Bathe them often  · Reduce exposure to mold  Do not spend time in basements  Choose artificial plants instead of live plants  Keep your home's humidity at less than 45%  Do not have ponds or standing water in your home or yard  · Do not smoke  Avoid others who smoke  Ask your healthcare provider for information if you currently smoke and need help to quit  Follow up with your healthcare provider as directed: You may need to see an allergist often to control your symptoms  Write down your questions so you remember to ask them during your visits  © 2017 [employer] Information is for End User's use only and may not be sold, redistributed or otherwise used for commercial purposes  All illustrations and images included in CareNotes® are the copyrighted property of [employer] , [employer]  or Pierre Ferraro  The above information is an  only  It is not intended as medical advice for individual conditions or treatments   Talk to your doctor, nurse or pharmacist before following any medical regimen to see if it is safe and effective for you

## 2020-08-04 ENCOUNTER — TELEPHONE (OUTPATIENT)
Dept: FAMILY MEDICINE CLINIC | Facility: CLINIC | Age: 35
End: 2020-08-04

## 2020-08-05 ENCOUNTER — APPOINTMENT (OUTPATIENT)
Dept: LAB | Facility: MEDICAL CENTER | Age: 35
End: 2020-08-05
Payer: COMMERCIAL

## 2020-08-05 ENCOUNTER — OFFICE VISIT (OUTPATIENT)
Dept: FAMILY MEDICINE CLINIC | Facility: CLINIC | Age: 35
End: 2020-08-05
Payer: COMMERCIAL

## 2020-08-05 VITALS
WEIGHT: 203 LBS | HEIGHT: 67 IN | OXYGEN SATURATION: 99 % | HEART RATE: 80 BPM | DIASTOLIC BLOOD PRESSURE: 78 MMHG | SYSTOLIC BLOOD PRESSURE: 108 MMHG | BODY MASS INDEX: 31.86 KG/M2 | TEMPERATURE: 97.1 F

## 2020-08-05 DIAGNOSIS — R09.82 PND (POST-NASAL DRIP): ICD-10-CM

## 2020-08-05 DIAGNOSIS — Z13.31 DEPRESSION SCREENING NEGATIVE: ICD-10-CM

## 2020-08-05 DIAGNOSIS — R22.2 PALPABLE MASS OF LOWER BACK: ICD-10-CM

## 2020-08-05 DIAGNOSIS — L30.9 DERMATITIS: ICD-10-CM

## 2020-08-05 DIAGNOSIS — R09.82 PND (POST-NASAL DRIP): Primary | ICD-10-CM

## 2020-08-05 DIAGNOSIS — Z13.220 SCREENING FOR HYPERLIPIDEMIA: ICD-10-CM

## 2020-08-05 DIAGNOSIS — R73.09 ELEVATED GLUCOSE: ICD-10-CM

## 2020-08-05 LAB
ALBUMIN SERPL BCP-MCNC: 3.5 G/DL (ref 3.5–5)
ALP SERPL-CCNC: 74 U/L (ref 46–116)
ALT SERPL W P-5'-P-CCNC: 34 U/L (ref 12–78)
ANION GAP SERPL CALCULATED.3IONS-SCNC: 5 MMOL/L (ref 4–13)
AST SERPL W P-5'-P-CCNC: 17 U/L (ref 5–45)
BILIRUB SERPL-MCNC: 0.27 MG/DL (ref 0.2–1)
BUN SERPL-MCNC: 12 MG/DL (ref 5–25)
CALCIUM SERPL-MCNC: 9.8 MG/DL (ref 8.3–10.1)
CHLORIDE SERPL-SCNC: 107 MMOL/L (ref 100–108)
CHOLEST SERPL-MCNC: 194 MG/DL (ref 50–200)
CO2 SERPL-SCNC: 26 MMOL/L (ref 21–32)
CREAT SERPL-MCNC: 0.81 MG/DL (ref 0.6–1.3)
ERYTHROCYTE [DISTWIDTH] IN BLOOD BY AUTOMATED COUNT: 13 % (ref 11.6–15.1)
EST. AVERAGE GLUCOSE BLD GHB EST-MCNC: 137 MG/DL
GFR SERPL CREATININE-BSD FRML MDRD: 109 ML/MIN/1.73SQ M
GLUCOSE SERPL-MCNC: 121 MG/DL (ref 65–140)
HBA1C MFR BLD: 6.4 %
HCT VFR BLD AUTO: 44.9 % (ref 34.8–46.1)
HDLC SERPL-MCNC: 47 MG/DL
HGB BLD-MCNC: 14.4 G/DL (ref 11.5–15.4)
LDLC SERPL CALC-MCNC: 127 MG/DL (ref 0–100)
MCH RBC QN AUTO: 26.9 PG (ref 26.8–34.3)
MCHC RBC AUTO-ENTMCNC: 32.1 G/DL (ref 31.4–37.4)
MCV RBC AUTO: 84 FL (ref 82–98)
PLATELET # BLD AUTO: 227 THOUSANDS/UL (ref 149–390)
PMV BLD AUTO: 10.9 FL (ref 8.9–12.7)
POTASSIUM SERPL-SCNC: 4.2 MMOL/L (ref 3.5–5.3)
PROT SERPL-MCNC: 7.7 G/DL (ref 6.4–8.2)
RBC # BLD AUTO: 5.36 MILLION/UL (ref 3.81–5.12)
SODIUM SERPL-SCNC: 138 MMOL/L (ref 136–145)
TRIGL SERPL-MCNC: 102 MG/DL
WBC # BLD AUTO: 7.75 THOUSAND/UL (ref 4.31–10.16)

## 2020-08-05 PROCEDURE — 3008F BODY MASS INDEX DOCD: CPT | Performed by: PHYSICIAN ASSISTANT

## 2020-08-05 PROCEDURE — 80053 COMPREHEN METABOLIC PANEL: CPT

## 2020-08-05 PROCEDURE — 80061 LIPID PANEL: CPT

## 2020-08-05 PROCEDURE — 86003 ALLG SPEC IGE CRUDE XTRC EA: CPT

## 2020-08-05 PROCEDURE — 83036 HEMOGLOBIN GLYCOSYLATED A1C: CPT

## 2020-08-05 PROCEDURE — 99214 OFFICE O/P EST MOD 30 MIN: CPT | Performed by: PHYSICIAN ASSISTANT

## 2020-08-05 PROCEDURE — 36415 COLL VENOUS BLD VENIPUNCTURE: CPT

## 2020-08-05 PROCEDURE — 82785 ASSAY OF IGE: CPT

## 2020-08-05 PROCEDURE — 85027 COMPLETE CBC AUTOMATED: CPT

## 2020-08-05 NOTE — PROGRESS NOTES
Assessment/Plan:    Problem List Items Addressed This Visit        Other    Elevated glucose    Relevant Orders    Hemoglobin A1C      Other Visit Diagnoses     PND (post-nasal drip)    -  Primary    Relevant Orders    [facility]ergy Panel, Adult    Ambulatory Referral to Otolaryngology    Palpable mass of lower back        Relevant Orders    Ambulatory referral to General Surgery    Dermatitis        Relevant Orders    [facility]ergy Panel, Adult    CBC    Comprehensive metabolic panel    Screening for hyperlipidemia        Relevant Orders    Lipid Panel with Direct LDL reflex    Depression screening negative        BMI 31 0-31 9,adult               Diagnoses and all orders for this visit:    PND (post-nasal drip)  -     [facility] Allergy Panel, Adult; Future  -     Ambulatory Referral to Otolaryngology; Future    Palpable mass of lower back  -     Ambulatory referral to General Surgery; Future    Dermatitis  -     [facility] Allergy Panel, Adult; Future  -     CBC; Future  -     Comprehensive metabolic panel; Future    Elevated glucose  -     Hemoglobin A1C; Future    Screening for hyperlipidemia  -     Lipid Panel with Direct LDL reflex; Future    Depression screening negative    BMI 31 0-31 9,adult              Subjective:      Patient ID: Bobo Levy is a 28 y o  female  Hilton Ashford returns today for:  1  Referral to see Dr Ashwini Lucas  She has an appointment with him tomorrow to discuss allergies/PND  2  She gets a rash on her hand when she pets cats  She is requesting some allergy testing along with routine labs  Will order these today  3  Complains of a painful lump on her lower L back, would like to have this removed  The following portions of the patient's history were reviewed and updated as appropriate:   She has a past medical history of Gestational diabetes mellitus and Migraines  ,  does not have any pertinent problems on file  ,   has a past surgical history that includes Hip surgery (Bilateral); Knee surgery (Right); and  section  ,  family history includes Diabetes in her mother; Hypertension in her mother  ,   reports that she has been smoking cigarettes  She has been smoking about 0 20 packs per day  She has never used smokeless tobacco  She reports that she does not drink alcohol or use drugs  ,  has No Known Allergies     Current Outpatient Medications   Medication Sig Dispense Refill    multivitamin (THERAGRAN) TABS Take 1 tablet by mouth daily      ibuprofen (MOTRIN) 600 mg tablet Take 1 tablet (600 mg total) by mouth every 6 (six) hours as needed for moderate pain (Patient not taking: Reported on 2020) 30 tablet 2    metFORMIN (GLUCOPHAGE) 500 mg tablet 1 tab daily in the p m  (Patient not taking: Reported on 2020) 30 tablet 3    tobramycin (TOBREX) 0 3 % SOLN Administer 1 drop to the right eye every 4 (four) hours while awake (Patient not taking: Reported on 2020) 1 Bottle 0     No current facility-administered medications for this visit  Review of Systems   Constitutional: Negative for activity change, appetite change, chills, diaphoresis, fatigue, fever and unexpected weight change  HENT: Positive for postnasal drip  Negative for congestion, ear pain, rhinorrhea, sinus pressure, sinus pain, sneezing, sore throat, tinnitus and voice change  Eyes: Negative for pain, redness and visual disturbance  Respiratory: Negative for cough, chest tightness, shortness of breath and wheezing  Cardiovascular: Negative for chest pain, palpitations and leg swelling  Gastrointestinal: Negative for abdominal pain, blood in stool, constipation, diarrhea, nausea and vomiting  Genitourinary: Negative for difficulty urinating, dysuria, frequency, hematuria and urgency  Musculoskeletal: Positive for back pain (painful lump along left lower back)  Negative for arthralgias, gait problem, joint swelling, myalgias, neck pain and neck stiffness     Skin: Positive for rash (when pets a cat)  Negative for color change, pallor and wound  Neurological: Negative for dizziness, tremors, weakness, light-headedness and headaches  Psychiatric/Behavioral: Negative for dysphoric mood, self-injury, sleep disturbance and suicidal ideas  The patient is not nervous/anxious  Objective:  Vitals:    08/05/20 1027   BP: 108/78   Pulse: 80   Temp: (!) 97 1 °F (36 2 °C)   SpO2: 99%   Weight: 92 1 kg (203 lb)   Height: 5' 7"     Body mass index is 31 79 kg/m²  PHQ-9 Depression Screening    PHQ-9:    Frequency of the following problems over the past two weeks:       Little interest or pleasure in doing things:  0 - not at all  Feeling down, depressed, or hopeless:  0 - not at all  PHQ-2 Score:  0        Physical Exam   Constitutional: She is oriented to person, place, and time  She appears well-developed  No distress  Face mask in place  HENT:   Head: Normocephalic and atraumatic  Right Ear: Hearing, tympanic membrane, external ear and ear canal normal    Left Ear: Hearing, tympanic membrane, external ear and ear canal normal    Mouth/Throat: Uvula is midline  Eyes: Conjunctivae are normal  Right eye exhibits no discharge  Left eye exhibits no discharge  No scleral icterus  Neck: Neck supple  Carotid bruit is not present  No thyromegaly present  Cardiovascular: Normal rate, regular rhythm and normal heart sounds  No murmur heard  Pulmonary/Chest: Effort normal and breath sounds normal  No respiratory distress  She has no wheezes  Abdominal: Soft  Bowel sounds are normal  She exhibits no distension and no mass  There is no abdominal tenderness  There is no rebound and no guarding  Musculoskeletal: Normal range of motion  General: No tenderness  Back:    Lymphadenopathy:     She has no cervical adenopathy  Neurological: She is alert and oriented to person, place, and time  Skin: Skin is warm and dry  No rash noted  She is not diaphoretic  No erythema  Psychiatric: Her behavior is normal  Judgment and thought content normal    Vitals reviewed  BMI Counseling: Body mass index is 31 79 kg/m²  The BMI is above normal  Nutrition recommendations include reducing portion sizes, decreasing overall calorie intake and 3-5 servings of fruits/vegetables daily  Exercise recommendations include exercising 3-5 times per week

## 2020-08-06 LAB
A ALTERNATA IGE QN: <0.1 KUA/I
A FUMIGATUS IGE QN: <0.1 KUA/I
ALLERGEN COMMENT: ABNORMAL
BERMUDA GRASS IGE QN: 0.62 KUA/I
BOXELDER IGE QN: <0.1 KUA/I
C HERBARUM IGE QN: 0.13 KUA/I
CAT DANDER IGE QN: <0.1 KUA/I
CMN PIGWEED IGE QN: <0.1 KUA/I
COMMON RAGWEED IGE QN: 1.36 KUA/I
COTTONWOOD IGE QN: <0.1 KUA/I
D FARINAE IGE QN: 7.06 KUA/I
D PTERONYSS IGE QN: 5.96 KUA/I
DOG DANDER IGE QN: 0.29 KUA/I
LONDON PLANE IGE QN: <0.1 KUA/I
MOUSE URINE PROT IGE QN: <0.1 KUA/I
MT JUNIPER IGE QN: 0.13 KUA/I
MUGWORT IGE QN: <0.1 KUA/I
P NOTATUM IGE QN: <0.1 KUA/I
ROACH IGE QN: 1.73 KUA/I
SHEEP SORREL IGE QN: 0.15 KUA/I
SILVER BIRCH IGE QN: 2.89 KUA/I
TIMOTHY IGE QN: 13.3 KUA/I
TOTAL IGE SMQN RAST: 711 KU/L (ref 0–113)
WALNUT IGE QN: 0.12 KUA/I
WHITE ASH IGE QN: 3.57 KUA/I
WHITE ELM IGE QN: <0.1 KUA/I
WHITE MULBERRY IGE QN: 1.16 KUA/I
WHITE OAK IGE QN: 0.66 KUA/I

## 2020-08-11 ENCOUNTER — TRANSCRIBE ORDERS (OUTPATIENT)
Dept: ADMINISTRATIVE | Facility: HOSPITAL | Age: 35
End: 2020-08-11

## 2020-08-11 ENCOUNTER — APPOINTMENT (OUTPATIENT)
Dept: LAB | Facility: MEDICAL CENTER | Age: 35
End: 2020-08-11
Payer: COMMERCIAL

## 2020-08-11 DIAGNOSIS — Z91.018 MULTIPLE FOOD ALLERGIES: ICD-10-CM

## 2020-08-11 DIAGNOSIS — J30.9 ALLERGIC RHINITIS, UNSPECIFIED SEASONALITY, UNSPECIFIED TRIGGER: Primary | ICD-10-CM

## 2020-08-11 DIAGNOSIS — J30.9 ALLERGIC RHINITIS, UNSPECIFIED SEASONALITY, UNSPECIFIED TRIGGER: ICD-10-CM

## 2020-08-11 PROCEDURE — 86003 ALLG SPEC IGE CRUDE XTRC EA: CPT

## 2020-08-11 PROCEDURE — 82785 ASSAY OF IGE: CPT

## 2020-08-11 PROCEDURE — 36415 COLL VENOUS BLD VENIPUNCTURE: CPT

## 2020-08-12 ENCOUNTER — CONSULT (OUTPATIENT)
Dept: SURGERY | Facility: CLINIC | Age: 35
End: 2020-08-12
Payer: COMMERCIAL

## 2020-08-12 VITALS
WEIGHT: 200 LBS | BODY MASS INDEX: 31.39 KG/M2 | SYSTOLIC BLOOD PRESSURE: 117 MMHG | DIASTOLIC BLOOD PRESSURE: 82 MMHG | HEART RATE: 87 BPM | TEMPERATURE: 98.8 F | HEIGHT: 67 IN

## 2020-08-12 DIAGNOSIS — R22.2 PALPABLE MASS OF LOWER BACK: ICD-10-CM

## 2020-08-12 DIAGNOSIS — R22.2 PALPABLE MASS OF LOWER BACK: Primary | ICD-10-CM

## 2020-08-12 PROCEDURE — 99243 OFF/OP CNSLTJ NEW/EST LOW 30: CPT | Performed by: SURGERY

## 2020-08-12 RX ORDER — PREDNISONE 10 MG/1
TABLET ORAL
COMMUNITY
Start: 2020-08-07

## 2020-08-12 RX ORDER — FLUTICASONE PROPIONATE 50 MCG
2 SPRAY, SUSPENSION (ML) NASAL DAILY
COMMUNITY
Start: 2020-08-07

## 2020-08-12 RX ORDER — LORATADINE 10 MG/1
10 TABLET ORAL DAILY
COMMUNITY
Start: 2020-08-10

## 2020-08-12 NOTE — PROGRESS NOTES
Assessment/Plan/Follow up information       Diagnosis ICD-10-CM Associated Orders   1  Palpable mass of lower back  R22 2 Ambulatory referral to General Surgery      Ultrasound area follow-up result      All recent lab work and imagining reviewed on today's visit with patient, appropriate follow up was initiated if needed  Patient was counseled/education regarding their diagnosis, and the associated plan  They agreed with plan, all questions and concerns were answered/addressed  Advised to contact me or the office with any concerns or questions  In the event of an emergency, and unable to contact a provider they are to go to the emergency room  Subjective    HPI:  27-year-old female presents to the referral for PCP for mass on back  Patient states she had hip surgery several years ago since that time has noticed severe lower back pain  She mention this to her PCP, who said that it was scar tissue wrapped around the nerve  and that she should see general surgery to have this removed   States the mass is nondiscrete painful to touch, and is deep in nature exacerbated with movement  Denies any alleviating factors, denies any similar episodes to this in the past       Review of Systems   Constitutional: Negative for activity change, appetite change, chills, fatigue and fever  HENT: Negative for congestion, dental problem, drooling, ear discharge, ear pain, facial swelling, postnasal drip, rhinorrhea and sinus pain  Eyes: Negative for photophobia, pain, discharge and itching  Respiratory: Negative for apnea, cough, chest tightness and shortness of breath  Cardiovascular: Negative for chest pain and leg swelling  Gastrointestinal: Negative for abdominal distention, abdominal pain, anal bleeding, constipation, diarrhea and nausea  Endocrine: Negative for cold intolerance, heat intolerance and polydipsia  Genitourinary: Negative for difficulty urinating     Musculoskeletal: Positive for back pain  Negative for arthralgias, gait problem, joint swelling and myalgias  Skin: Negative for color change and pallor  Allergic/Immunologic: Negative for immunocompromised state  Neurological: Negative for dizziness, seizures, facial asymmetry, weakness, light-headedness, numbness and headaches  Psychiatric/Behavioral: Negative for agitation, behavioral problems, confusion, decreased concentration and dysphoric mood  Objective    Vitals:    08/12/20 1054   BP: 117/82   Pulse: 87   Temp: 98 8 °F (37 1 °C)         Physical Exam  Constitutional:       General: She is not in acute distress  Appearance: She is well-developed  HENT:      Head: Normocephalic and atraumatic  Eyes:      Conjunctiva/sclera: Conjunctivae normal       Pupils: Pupils are equal, round, and reactive to light  Neck:      Musculoskeletal: Normal range of motion and neck supple  Cardiovascular:      Rate and Rhythm: Normal rate and regular rhythm  Heart sounds: Normal heart sounds  No murmur  No friction rub  Pulmonary:      Effort: Pulmonary effort is normal       Breath sounds: Normal breath sounds  Abdominal:      General: Bowel sounds are normal       Palpations: Abdomen is soft  Musculoskeletal: Normal range of motion  Arms:       Comments: Patient notes a mass in the above-noted area, however on my examination I am unable to find/palpate any discrete masses/nodules  There is nontender to the touch with no obvious overlying skin changes   Skin:     General: Skin is warm  Capillary Refill: Capillary refill takes less than 2 seconds  Neurological:      Mental Status: She is alert and oriented to person, place, and time  Motor: No abnormal muscle tone  Coordination: Coordination normal    Psychiatric:         Behavior: Behavior normal          Thought Content: Thought content normal             Portions of the record may have been created with voice recognition software  Occasional wrong word or "sound a like" substitutions may have occurred due to the inherent limitations of voice recognition software  Read the chart carefully and recognize, using context, where substitutions have occurred  Contact me with any questions         Fracisco Jaquez MD 08/12/20

## 2020-08-13 LAB
ALLERGEN COMMENT: ABNORMAL
ALLERGEN COMMENT: ABNORMAL
ALMOND IGE QN: 0.12 KUA/I
ALMOND IGE QN: <0.1 KUA/I
BRAZIL NUT IGE QN: <0.1 KUA/I
CASHEW NUT IGE QN: <0.1 KUA/I
CASHEW NUT IGE QN: <0.1 KUA/I
CODFISH IGE QN: <0.1 KUA/I
EGG WHITE IGE QN: <0.1 KUA/I
GLUTEN IGE QN: <0.1 KUA/I
HAZELNUT IGE QN: 1.62 KUA/L
HAZELNUT IGE QN: 1.66 KUA/L
MILK IGE QN: <0.1 KUA/I
PEANUT IGE QN: <0.1 KUA/I
PEANUT IGE QN: <0.1 KUA/I
PECAN/HICK NUT IGE QN: <0.1 KUA/I
PISTACHIO IGE QN: 0.11 KUA/I
SALMON IGE QN: <0.1 KUA/I
SCALLOP IGE QN: <0.1 KUA/L
SESAME SEED IGE QN: <0.1 KUA/I
SHRIMP IGE QN: 1.13 KUA/L
SOYBEAN IGE QN: <0.1 KUA/I
TOTAL IGE SMQN RAST: 885 KU/L (ref 0–113)
TOTAL IGE SMQN RAST: 942 KU/L (ref 0–113)
TUNA IGE QN: <0.1 KUA/I
WALNUT IGE QN: <0.1 KUA/I
WALNUT IGE QN: <0.1 KUA/I
WHEAT IGE QN: 0.11 KUA/I

## 2020-08-17 ENCOUNTER — HOSPITAL ENCOUNTER (OUTPATIENT)
Dept: ULTRASOUND IMAGING | Facility: HOSPITAL | Age: 35
Discharge: HOME | End: 2020-08-17
Attending: SURGERY
Payer: COMMERCIAL

## 2020-08-17 DIAGNOSIS — R22.2 PALPABLE MASS OF LOWER BACK: ICD-10-CM

## 2020-08-17 PROCEDURE — 76705 ECHO EXAM OF ABDOMEN: CPT

## 2021-01-19 ENCOUNTER — TELEPHONE (OUTPATIENT)
Dept: FAMILY MEDICINE CLINIC | Facility: CLINIC | Age: 36
End: 2021-01-19